# Patient Record
(demographics unavailable — no encounter records)

---

## 2025-07-01 NOTE — PHYSICAL EXAM
[Fully active, able to carry on all pre-disease performance without restriction] : Status 0 - Fully active, able to carry on all pre-disease performance without restriction [General Appearance - Alert] : alert [General Appearance - In No Acute Distress] : in no acute distress [General Appearance - Well Nourished] : well nourished [Sclera] : the sclera and conjunctiva were normal [Outer Ear] : the ears and nose were normal in appearance [Hearing Threshold Finger Rub Not [place]] : hearing was normal [Neck Appearance] : the appearance of the neck was normal [Neck Cervical Mass (___cm)] : no neck mass was observed [] : no respiratory distress [Respiration, Rhythm And Depth] : normal respiratory rhythm and effort [Exaggerated Use Of Accessory Muscles For Inspiration] : no accessory muscle use [Auscultation Breath Sounds / Voice Sounds] : lungs were clear to auscultation bilaterally [Heart Rate And Rhythm] : heart rate was normal and rhythm regular [Examination Of The Chest] : the chest was normal in appearance [Chest Visual Inspection Thoracic Asymmetry] : no chest asymmetry [Diminished Respiratory Excursion] : normal chest expansion [2+] : left 2+ [Breast Appearance] : normal in appearance [Breast Palpation Mass] : no palpable masses [Bowel Sounds] : normal bowel sounds [Cervical Lymph Nodes Enlarged Posterior Bilaterally] : posterior cervical [Cervical Lymph Nodes Enlarged Anterior Bilaterally] : anterior cervical [Supraclavicular Lymph Nodes Enlarged Bilaterally] : supraclavicular [No CVA Tenderness] : no ~M costovertebral angle tenderness [No Spinal Tenderness] : no spinal tenderness [Involuntary Movements] : no involuntary movements were seen [Skin Color & Pigmentation] : normal skin color and pigmentation [No Focal Deficits] : no focal deficits [Oriented To Time, Place, And Person] : oriented to person, place, and time [FreeTextEntry1] : Deferred

## 2025-07-01 NOTE — PHYSICAL EXAM
[Fully active, able to carry on all pre-disease performance without restriction] : Status 0 - Fully active, able to carry on all pre-disease performance without restriction [General Appearance - Alert] : alert [General Appearance - In No Acute Distress] : in no acute distress [General Appearance - Well Nourished] : well nourished [Sclera] : the sclera and conjunctiva were normal [Outer Ear] : the ears and nose were normal in appearance [Hearing Threshold Finger Rub Not [place]] : hearing was normal [Neck Appearance] : the appearance of the neck was normal [Neck Cervical Mass (___cm)] : no neck mass was observed [] : no respiratory distress [Respiration, Rhythm And Depth] : normal respiratory rhythm and effort [Exaggerated Use Of Accessory Muscles For Inspiration] : no accessory muscle use [Auscultation Breath Sounds / Voice Sounds] : lungs were clear to auscultation bilaterally [Heart Rate And Rhythm] : heart rate was normal and rhythm regular [Examination Of The Chest] : the chest was normal in appearance [Chest Visual Inspection Thoracic Asymmetry] : no chest asymmetry [Diminished Respiratory Excursion] : normal chest expansion [2+] : left 2+ [Breast Appearance] : normal in appearance [Breast Palpation Mass] : no palpable masses [Bowel Sounds] : normal bowel sounds [Cervical Lymph Nodes Enlarged Posterior Bilaterally] : posterior cervical [Cervical Lymph Nodes Enlarged Anterior Bilaterally] : anterior cervical [Supraclavicular Lymph Nodes Enlarged Bilaterally] : supraclavicular [No CVA Tenderness] : no ~M costovertebral angle tenderness [No Spinal Tenderness] : no spinal tenderness [Involuntary Movements] : no involuntary movements were seen [Skin Color & Pigmentation] : normal skin color and pigmentation [No Focal Deficits] : no focal deficits [Oriented To Time, Place, And Person] : oriented to person, place, and time [FreeTextEntry1] : Deferred

## 2025-07-01 NOTE — HISTORY OF PRESENT ILLNESS
[FreeTextEntry1] : Mr. JHON MORENO, 78 year old male, Former smoker (2-3 cigs daily x 20 years; Quit 2022), w/ hx of emphysema; s/p Hernia repair, appendectomy.  Referred by Dr. Abilio Mcneill for surgical evaluation.   PET/CT on 6/4/2025:  -Emphysematous are noted in the lungs.  - There are asymmetric pleural and parenchymal opacities in the right lung apex, which are not significantly hypermetabolic on the corresponding PET images, and most likely represent scarring.  - Large bulla in the left upper lung, which measures 17 x 8.6 cm, image 113. This bulla causes mass effect on the mediastinum.  - Mildly hypermetabolic wedge-shaped opacity in the posteromedial ALICE of the lung, which measures approximately 4 x 2.1 cm (image 132). SUV 1.1-1.9.  - Dependent changes are noted throughout the lungs. - Coronary artery calcifications are noted. - There is an infrarenal abdominal aortic aneurysm, measuring 3.3 x 3 cm (image 198) - Diverticula are noted in the colon without evidence of significant associated inflammation. - There is no evidence of enlarged intra-abdominal, pelvic or inguinal lymphadenopathy. - The prostate is enlarged, indenting the bladder base. Otherwise, the visualized pelvic structures are unremarkable. - Review of the bone windows reveals degenerative changes of the spine. There are a few lucent/lytic lesions in the right iliac bone, which are not significantly hypermetabolic on the corresponding PET images. Representative lesions include a 0.9 cm lesion (image 225)  Patient presents today for consultation. Today patient reports shortness of breath upon exertion. Today, he denies chest pain, cough, hemoptysis, fever, chills, night sweats, lightheadedness or dizziness.

## 2025-07-01 NOTE — ASSESSMENT
[FreeTextEntry1] : Mr. JHON MORENO, 78 year old male, Former smoker (2-3 cigs daily x 20 years; Quit 2022), w/ hx of emphysema; s/p Hernia repair, appendectomy.  Referred by Dr. Abilio Mcneill for surgical evaluation.   I have independently reviewed the medical records and imaging at the time of this office consultation, and discussed the following interpretations with the patient: - CT imaging reviewed and results relayed to patient. Discussed Flex Bronch, Left VATS, Bullectomy with Tami Strips. Risks, benefits and alternatives explained to patient; All questions answered and patient agrees to proceed with surgery.  - Patient also noted to have a mildly hypermetabolic ALICE opacity. will address after bullectomy.  - Cardiac clearance and PFTs required prior to procedure.  - Patient agreeable to the above.   Recommendations reviewed with patient during this office visit, and all questions answered; Patient instructed on the importance of follow up and verbalizes understanding.  I, TORO Donahue, personally performed the evaluation and management (E/M) services for this new patient. That E/M includes conducting the initial examination, assessing all conditions, and establishing the plan of care. Today, My ACP, Lissett Mathew, was here to observe my evaluation and management services for this patient to be followed going forward.

## 2025-07-15 NOTE — ASSESSMENT
[FreeTextEntry1] : Mr. JHON MORENO, 78 year old male, Former smoker (2-3 cigs daily x 20 years; Quit 2022), w/ hx of emphysema; s/p Hernia repair, appendectomy.  Referred by Dr. Abilio Mcneill for surgical evaluation.   Patient presents today, via tele, to further discuss surgery.   I have independently reviewed the medical records and imaging at the time of this office consultation, and discussed the following interpretations with the patient:  Recommendations reviewed with patient during this office visit, and all questions answered; Patient instructed on the importance of follow up and verbalizes understanding.

## 2025-07-15 NOTE — HISTORY OF PRESENT ILLNESS
[FreeTextEntry1] : Mr. JHON MORENO, 78 year old male, Former smoker (2-3 cigs daily x 20 years; Quit 2022), w/ hx of emphysema; s/p Hernia repair, appendectomy.  Referred by Dr. Abilio Mcneill for surgical evaluation.   PET/CT on 6/4/2025:  -Emphysematous are noted in the lungs.  - There are asymmetric pleural and parenchymal opacities in the right lung apex, which are not significantly hypermetabolic on the corresponding PET images, and most likely represent scarring.  - Large bulla in the left upper lung, which measures 17 x 8.6 cm, image 113. This bulla causes mass effect on the mediastinum.  - Mildly hypermetabolic wedge-shaped opacity in the posteromedial ALICE of the lung, which measures approximately 4 x 2.1 cm (image 132). SUV 1.1-1.9.  - Dependent changes are noted throughout the lungs. - Coronary artery calcifications are noted. - There is an infrarenal abdominal aortic aneurysm, measuring 3.3 x 3 cm (image 198) - Diverticula are noted in the colon without evidence of significant associated inflammation. - There is no evidence of enlarged intra-abdominal, pelvic or inguinal lymphadenopathy. - The prostate is enlarged, indenting the bladder base. Otherwise, the visualized pelvic structures are unremarkable. - Review of the bone windows reveals degenerative changes of the spine. There are a few lucent/lytic lesions in the right iliac bone, which are not significantly hypermetabolic on the corresponding PET images. Representative lesions include a 0.9 cm lesion (image 225)  Consulted on 7/1/25:  - CT imaging reviewed and results relayed to patient. Discussed Flex Bronch, Left VATS, Bullectomy with Tami Strips. Risks, benefits and alternatives explained to patient; All questions answered and patient agrees to proceed with surgery.  - Patient also noted to have a mildly hypermetabolic ALICE opacity. will address after bullectomy.  - Cardiac clearance and PFTs required prior to procedure.  - Patient agreeable to the above.   Patient presents today, via tele ,to further discuss surgery.

## 2025-07-21 NOTE — REASON FOR VISIT
[Follow-Up: _____] : a [unfilled] follow-up visit [Home] : at home, [unfilled] , at the time of the educational consult. [Medical Office: ([facility])___] : at the medical office located in  [Telephone (audio)] : This telephonic visit was provided via audio only technology. [Participant] : the participant [Self] : self

## 2025-07-21 NOTE — ASSESSMENT
[FreeTextEntry1] : Mr. JHON MORENO, 78 year old male, Former smoker (2-3 cigs daily x 20 years; Quit 2022), w/ hx of emphysema; s/p Hernia repair, appendectomy.  Referred by Dr. Abilio Mcneill for surgical evaluation.   Patient presents today, via tele, to further discuss surgery.   I have independently reviewed the medical records and imaging at the time of this office consultation, and discussed the following interpretations with the patient: - Further discussed Flex Bronch, Left VATS, Bullectomy with Tami Strips. Risks, benefits and alternatives explained to patient; All questions answered and patient agrees to proceed with surgery. - Patient also noted to have a mildly hypermetabolic ALICE opacity. will address after bullectomy.  Recommendations reviewed with patient during this office visit, and all questions answered; Patient instructed on the importance of follow up and verbalizes understanding.  I, TORO Donahue, personally performed the evaluation and management (E/M) services for this established patient. That E/M includes assessing all new/exacerbated conditions, and establishing a new plan of care. Today, My ACP, Lissett Mathew, was here to observe my evaluation and management services for this patient to be followed going forward.